# Patient Record
Sex: FEMALE | Race: BLACK OR AFRICAN AMERICAN | NOT HISPANIC OR LATINO | Employment: OTHER | URBAN - METROPOLITAN AREA
[De-identification: names, ages, dates, MRNs, and addresses within clinical notes are randomized per-mention and may not be internally consistent; named-entity substitution may affect disease eponyms.]

---

## 2018-03-15 ENCOUNTER — OFFICE VISIT (OUTPATIENT)
Dept: OBSTETRICS AND GYNECOLOGY | Facility: CLINIC | Age: 68
End: 2018-03-15
Payer: MEDICARE

## 2018-03-15 VITALS
DIASTOLIC BLOOD PRESSURE: 79 MMHG | WEIGHT: 215.5 LBS | HEIGHT: 64 IN | SYSTOLIC BLOOD PRESSURE: 145 MMHG | BODY MASS INDEX: 36.79 KG/M2

## 2018-03-15 DIAGNOSIS — N76.3 CHRONIC VULVITIS: Primary | ICD-10-CM

## 2018-03-15 DIAGNOSIS — N94.10 DYSPAREUNIA, FEMALE: ICD-10-CM

## 2018-03-15 PROCEDURE — 99203 OFFICE O/P NEW LOW 30 MIN: CPT | Mod: S$GLB,,, | Performed by: OBSTETRICS & GYNECOLOGY

## 2018-03-15 PROCEDURE — 99999 PR PBB SHADOW E&M-NEW PATIENT-LVL III: CPT | Mod: PBBFAC,,, | Performed by: OBSTETRICS & GYNECOLOGY

## 2018-03-15 RX ORDER — ETODOLAC 500 MG/1
TABLET, FILM COATED ORAL
COMMUNITY
Start: 2017-12-05

## 2018-03-15 RX ORDER — PENICILLIN V POTASSIUM 500 MG/1
TABLET, FILM COATED ORAL
COMMUNITY
Start: 2018-03-10 | End: 2021-12-20 | Stop reason: ALTCHOICE

## 2018-03-15 RX ORDER — METHOCARBAMOL 750 MG/1
TABLET, FILM COATED ORAL
COMMUNITY
Start: 2017-07-19 | End: 2021-12-20

## 2018-03-15 RX ORDER — MULTIVITAMIN
TABLET ORAL
COMMUNITY

## 2018-03-15 RX ORDER — ESTRADIOL 10 UG/1
INSERT VAGINAL
Qty: 18 TABLET | Refills: 11 | Status: SHIPPED | OUTPATIENT
Start: 2018-03-15 | End: 2021-12-20 | Stop reason: ALTCHOICE

## 2018-03-15 RX ORDER — ARIPIPRAZOLE 5 MG/1
TABLET ORAL
COMMUNITY
Start: 2018-03-10

## 2018-03-15 RX ORDER — ALPRAZOLAM 0.5 MG/1
TABLET ORAL
COMMUNITY
Start: 2018-03-10

## 2018-03-15 RX ORDER — MUPIROCIN CALCIUM 20 MG/G
CREAM TOPICAL
COMMUNITY
Start: 2017-08-30 | End: 2018-08-30

## 2018-03-15 RX ORDER — LIDOCAINE AND PRILOCAINE 25; 25 MG/G; MG/G
CREAM TOPICAL
COMMUNITY
Start: 2018-01-19 | End: 2021-12-20 | Stop reason: ALTCHOICE

## 2018-03-15 RX ORDER — NYSTATIN 100000 U/G
CREAM TOPICAL
COMMUNITY
Start: 2017-08-30 | End: 2021-12-20 | Stop reason: ALTCHOICE

## 2018-03-15 RX ORDER — MUPIROCIN 20 MG/G
OINTMENT TOPICAL
Refills: 1 | COMMUNITY
Start: 2017-12-12

## 2018-03-15 RX ORDER — DULOXETIN HYDROCHLORIDE 30 MG/1
CAPSULE, DELAYED RELEASE ORAL
COMMUNITY
Start: 2017-04-02

## 2018-03-15 RX ORDER — ACETAMINOPHEN AND CODEINE PHOSPHATE 300; 30 MG/1; MG/1
TABLET ORAL
COMMUNITY
Start: 2017-08-23 | End: 2021-12-20

## 2018-03-15 RX ORDER — AMLODIPINE BESYLATE 5 MG/1
TABLET ORAL
COMMUNITY
Start: 2018-03-09

## 2018-03-15 RX ORDER — TEMAZEPAM 15 MG/1
CAPSULE ORAL
COMMUNITY
Start: 2018-03-10

## 2018-03-15 RX ORDER — OLMESARTAN MEDOXOMIL AND HYDROCHLOROTHIAZIDE 40/12.5 40; 12.5 MG/1; MG/1
TABLET ORAL
COMMUNITY
Start: 2017-08-30

## 2018-03-15 RX ORDER — CLOTRIMAZOLE AND BETAMETHASONE DIPROPIONATE 10; .64 MG/G; MG/G
CREAM TOPICAL
Qty: 15 G | Refills: 1 | Status: SHIPPED | OUTPATIENT
Start: 2018-03-15 | End: 2019-03-15

## 2018-03-15 NOTE — PROGRESS NOTES
Subjective:       Patient ID: Celeste Martini is a 67 y.o. female.    Chief Complaint:  Vaginitis (itch x's 1 month)      History of Present Illness  HPI  here for problem   C/o itching on skin under breast and below panus and in groin  Using topical diaper rash like cream  Also c/o vaginal pain with intercourse    GYN & OB History  Patient's last menstrual period was 03/15/1988.   Date of Last Pap: No result found    OB History    Para Term  AB Living   3 2     1 3   SAB TAB Ectopic Multiple Live Births   1       2      # Outcome Date GA Lbr Paul/2nd Weight Sex Delivery Anes PTL Lv   3 SAB            2 Para      Vag-Spont   WILLIAM   1 Para      Vag-Spont   WILLIAM          Review of Systems  Review of Systems   Genitourinary: Positive for dyspareunia.   Skin:  Positive for rash.           Objective:    Physical Exam:   Constitutional: She appears well-developed.     Eyes: Conjunctivae and EOM are normal. Pupils are equal, round, and reactive to light.    Neck: Normal range of motion. Neck supple.     Pulmonary/Chest: Effort normal. Right breast exhibits no mass, no nipple discharge, no skin change, no tenderness and presence. Left breast exhibits no mass, no nipple discharge, no skin change, no tenderness and presence. Breasts are symmetrical.        Abdominal: Soft.     Genitourinary: Vagina normal.       Pelvic exam was performed with patient supine. Uterus is absent. Right adnexum displays no mass. Left adnexum displays no mass. Vaginal cuff normal.Cervix exhibits absence.   Genitourinary Comments: atrophic           Musculoskeletal: Normal range of motion.       Neurological: She is alert.    Skin: Skin is warm.    Psychiatric: She has a normal mood and affect.          Assessment:        Encounter Diagnoses   Name Primary?    Chronic vulvitis Yes    Dyspareunia, female              Plan:      Gentle skin cleansing  rx sent for lotrisone  rx sent for yuvafem

## 2018-03-16 ENCOUNTER — TELEPHONE (OUTPATIENT)
Dept: OBSTETRICS AND GYNECOLOGY | Facility: CLINIC | Age: 68
End: 2018-03-16

## 2018-03-16 NOTE — TELEPHONE ENCOUNTER
----- Message from iTana Denney sent at 3/16/2018 11:47 AM CDT -----  Contact: Pt  Pt needs to speak with nurse about the medication     Says the medication needs instructions     estradiol (YUVAFEM) 10 mcg Tab    Says the pharmacy does not understand the instructions.     Pt can be reached at [phone]    Thanks

## 2018-03-23 ENCOUNTER — TELEPHONE (OUTPATIENT)
Dept: OBSTETRICS AND GYNECOLOGY | Facility: CLINIC | Age: 68
End: 2018-03-23

## 2018-04-16 ENCOUNTER — TELEPHONE (OUTPATIENT)
Dept: OBSTETRICS AND GYNECOLOGY | Facility: CLINIC | Age: 68
End: 2018-04-16

## 2018-04-16 RX ORDER — CLOTRIMAZOLE AND BETAMETHASONE DIPROPIONATE 10; .64 MG/G; MG/G
CREAM TOPICAL
Qty: 45 G | Refills: 1 | Status: SHIPPED | OUTPATIENT
Start: 2018-04-16 | End: 2019-04-16

## 2018-04-16 NOTE — TELEPHONE ENCOUNTER
Spoke to patient and she states that she will pay for the cream since it is not covered under her insurance.

## 2018-04-16 NOTE — TELEPHONE ENCOUNTER
Spoke to patient and she states she is having irritation on her panty line and under her breasts and that the cream is not working. Patient wants to know if a different cream can be recommended. Pharmacy and allergies reviewed. Please advise.

## 2018-04-16 NOTE — TELEPHONE ENCOUNTER
----- Message from Izabella Washington sent at 4/16/2018  9:55 AM CDT -----  Contact: Self [phone]  Patient is requesting a call back from the nurse to see if she can get a different RX as the cream is not working for her.    Patient may be reached at [phone].    Thank you.    LC

## 2018-04-16 NOTE — TELEPHONE ENCOUNTER
The rx that I would send for combination antifungal/anti itch is not covered by her insurance (clotrimazole/betamethasone)    She can  otc hydrocortisone and mix together with nystatin and rub on skin

## 2018-04-25 ENCOUNTER — TELEPHONE (OUTPATIENT)
Dept: OBSTETRICS AND GYNECOLOGY | Facility: CLINIC | Age: 68
End: 2018-04-25

## 2018-04-25 NOTE — TELEPHONE ENCOUNTER
----- Message from Mario Cardozo sent at 4/25/2018 12:09 PM CDT -----  Contact: Pt  Pt. Is calling regarding questions about the cream clotrimazole that was sent over. Pt states that it was the same cream from before just in a larger quantity. Pt. States that the cream is not working.  ..[phone] (home)

## 2018-04-26 RX ORDER — CLOBETASOL PROPIONATE 0.5 MG/G
CREAM TOPICAL 2 TIMES DAILY
Qty: 45 G | Refills: 2 | Status: SHIPPED | OUTPATIENT
Start: 2018-04-26 | End: 2018-05-06

## 2018-04-26 NOTE — TELEPHONE ENCOUNTER
Pt. Is calling regarding questions about the cream clotrimazole that was sent over. Pt states that it was the same cream from before just in a larger quantity. Pt. States that the cream is not working.  ..[phone] (home)

## 2021-12-20 ENCOUNTER — OFFICE VISIT (OUTPATIENT)
Dept: OBSTETRICS AND GYNECOLOGY | Facility: CLINIC | Age: 71
End: 2021-12-20
Payer: MEDICARE

## 2021-12-20 VITALS
SYSTOLIC BLOOD PRESSURE: 140 MMHG | HEIGHT: 64 IN | WEIGHT: 214.06 LBS | BODY MASS INDEX: 36.55 KG/M2 | DIASTOLIC BLOOD PRESSURE: 62 MMHG

## 2021-12-20 DIAGNOSIS — N76.3 CHRONIC VULVITIS: Primary | ICD-10-CM

## 2021-12-20 PROCEDURE — 99203 OFFICE O/P NEW LOW 30 MIN: CPT | Mod: S$GLB,,, | Performed by: OBSTETRICS & GYNECOLOGY

## 2021-12-20 PROCEDURE — 99999 PR PBB SHADOW E&M-NEW PATIENT-LVL III: ICD-10-PCS | Mod: PBBFAC,,, | Performed by: OBSTETRICS & GYNECOLOGY

## 2021-12-20 PROCEDURE — 99999 PR PBB SHADOW E&M-NEW PATIENT-LVL III: CPT | Mod: PBBFAC,,, | Performed by: OBSTETRICS & GYNECOLOGY

## 2021-12-20 PROCEDURE — 99203 PR OFFICE/OUTPT VISIT, NEW, LEVL III, 30-44 MIN: ICD-10-PCS | Mod: S$GLB,,, | Performed by: OBSTETRICS & GYNECOLOGY

## 2021-12-20 RX ORDER — GLUCOSAMINE HCL 500 MG
TABLET ORAL
COMMUNITY

## 2021-12-20 RX ORDER — FLUCONAZOLE 200 MG/1
200 TABLET ORAL DAILY
Qty: 3 TABLET | Refills: 0 | Status: SHIPPED | OUTPATIENT
Start: 2021-12-20 | End: 2021-12-23

## 2021-12-20 RX ORDER — NYSTATIN AND TRIAMCINOLONE ACETONIDE 100000; 1 [USP'U]/G; MG/G
CREAM TOPICAL
Qty: 30 G | Refills: 1 | Status: SHIPPED | OUTPATIENT
Start: 2021-12-20 | End: 2022-12-20

## 2021-12-20 RX ORDER — ACETAMINOPHEN, DIPHENHYDRAMINE HCL, PHENYLEPHRINE HCL 325; 25; 5 MG/1; MG/1; MG/1
TABLET ORAL
COMMUNITY

## 2021-12-20 RX ORDER — INDOMETHACIN 50 MG/1
50 CAPSULE ORAL 2 TIMES DAILY PRN
COMMUNITY
Start: 2021-10-19

## 2022-03-14 ENCOUNTER — NEW PATIENT (OUTPATIENT)
Dept: URBAN - METROPOLITAN AREA CLINIC 20 | Facility: CLINIC | Age: 72
End: 2022-03-14

## 2022-03-14 DIAGNOSIS — H16.223: ICD-10-CM

## 2022-03-14 DIAGNOSIS — H35.363: ICD-10-CM

## 2022-03-14 DIAGNOSIS — Z96.1: ICD-10-CM

## 2022-03-14 DIAGNOSIS — H18.611: ICD-10-CM

## 2022-03-14 PROCEDURE — 92004 COMPRE OPH EXAM NEW PT 1/>: CPT

## 2022-03-14 ASSESSMENT — VISUAL ACUITY
OU_SC: J7
OD_SC: 20/200
OS_SC: 20/30
OD_PH: 20/50
OS_SC: J10
OD_SC: 20/400

## 2022-03-14 ASSESSMENT — TONOMETRY
OS_IOP_MMHG: 12
OD_IOP_MMHG: 14

## 2022-08-02 ENCOUNTER — TELEPHONE (OUTPATIENT)
Dept: OBSTETRICS AND GYNECOLOGY | Facility: HOSPITAL | Age: 72
End: 2022-08-02
Payer: MEDICARE

## 2022-08-02 NOTE — TELEPHONE ENCOUNTER
Attempted to reach patient.  Voicemail box not set up yet.    Called emergency contact and l/m for patient to return call to [phone] so we can assist patient with appointment.

## 2022-08-02 NOTE — TELEPHONE ENCOUNTER
----- Message from Britt Levi LPN sent at 8/1/2022 10:41 AM CDT -----  Contact: [phone]  NADYA Levi,     Patient  called stated that the medication nystatin-triamcinolone (MYCOLOG II) cream that she was given is not working.   Please advise.   Thank you.     Diane   ----- Message -----  From: Radha Ramirez  Sent: 8/1/2022  10:37 AM CDT  To: Gurmeet Corey Staff    .1 Patient would like to get medical advice.  Symptoms (please be specific): vaginal pimples, boils, sweating.   How long has patient had these symptoms:   Pharmacy name and phone#:[facility] #[id number] - [place] - [address] AT [facility] [address]  Phone:  [phone] Fax:  [phone]  Any drug allergies: on file  Comments: Patient would like to get medical advice. Patient stated that the medication nystatin-triamcinolone (MYCOLOG II) cream that she was given is not working. Please call and advise. Thank you.

## 2022-08-09 ENCOUNTER — OFFICE VISIT (OUTPATIENT)
Dept: OBSTETRICS AND GYNECOLOGY | Facility: CLINIC | Age: 72
End: 2022-08-09
Payer: MEDICARE

## 2022-08-09 VITALS
DIASTOLIC BLOOD PRESSURE: 56 MMHG | SYSTOLIC BLOOD PRESSURE: 118 MMHG | WEIGHT: 206.69 LBS | BODY MASS INDEX: 35.29 KG/M2 | HEIGHT: 64 IN

## 2022-08-09 DIAGNOSIS — L29.2 VULVAR ITCHING: Primary | ICD-10-CM

## 2022-08-09 PROBLEM — E04.2 MULTINODULAR GOITER: Status: ACTIVE | Noted: 2018-01-03

## 2022-08-09 PROBLEM — E88.819 INSULIN RESISTANCE: Status: ACTIVE | Noted: 2022-08-09

## 2022-08-09 PROBLEM — I10 ESSENTIAL HYPERTENSION: Status: ACTIVE | Noted: 2022-08-09

## 2022-08-09 PROBLEM — E55.9 VITAMIN D DEFICIENCY: Status: ACTIVE | Noted: 2022-08-09

## 2022-08-09 PROBLEM — M85.80 OSTEOPENIA: Status: ACTIVE | Noted: 2022-08-09

## 2022-08-09 PROBLEM — E66.01 SEVERE OBESITY (BMI 35.0-35.9 WITH COMORBIDITY): Status: ACTIVE | Noted: 2019-04-30

## 2022-08-09 PROBLEM — M72.2 PLANTAR FASCIITIS: Status: ACTIVE | Noted: 2022-08-09

## 2022-08-09 PROBLEM — K57.90 DIVERTICULOSIS: Status: ACTIVE | Noted: 2019-12-07

## 2022-08-09 PROCEDURE — 1126F PR PAIN SEVERITY QUANTIFIED, NO PAIN PRESENT: ICD-10-PCS | Mod: CPTII,S$GLB,, | Performed by: NURSE PRACTITIONER

## 2022-08-09 PROCEDURE — 1159F PR MEDICATION LIST DOCUMENTED IN MEDICAL RECORD: ICD-10-PCS | Mod: CPTII,S$GLB,, | Performed by: NURSE PRACTITIONER

## 2022-08-09 PROCEDURE — 1101F PT FALLS ASSESS-DOCD LE1/YR: CPT | Mod: CPTII,S$GLB,, | Performed by: NURSE PRACTITIONER

## 2022-08-09 PROCEDURE — 3074F PR MOST RECENT SYSTOLIC BLOOD PRESSURE < 130 MM HG: ICD-10-PCS | Mod: CPTII,S$GLB,, | Performed by: NURSE PRACTITIONER

## 2022-08-09 PROCEDURE — 3008F PR BODY MASS INDEX (BMI) DOCUMENTED: ICD-10-PCS | Mod: CPTII,S$GLB,, | Performed by: NURSE PRACTITIONER

## 2022-08-09 PROCEDURE — 1126F AMNT PAIN NOTED NONE PRSNT: CPT | Mod: CPTII,S$GLB,, | Performed by: NURSE PRACTITIONER

## 2022-08-09 PROCEDURE — 3078F DIAST BP <80 MM HG: CPT | Mod: CPTII,S$GLB,, | Performed by: NURSE PRACTITIONER

## 2022-08-09 PROCEDURE — 99213 PR OFFICE/OUTPT VISIT, EST, LEVL III, 20-29 MIN: ICD-10-PCS | Mod: S$GLB,,, | Performed by: NURSE PRACTITIONER

## 2022-08-09 PROCEDURE — 1159F MED LIST DOCD IN RCRD: CPT | Mod: CPTII,S$GLB,, | Performed by: NURSE PRACTITIONER

## 2022-08-09 PROCEDURE — 3074F SYST BP LT 130 MM HG: CPT | Mod: CPTII,S$GLB,, | Performed by: NURSE PRACTITIONER

## 2022-08-09 PROCEDURE — 3078F PR MOST RECENT DIASTOLIC BLOOD PRESSURE < 80 MM HG: ICD-10-PCS | Mod: CPTII,S$GLB,, | Performed by: NURSE PRACTITIONER

## 2022-08-09 PROCEDURE — 87481 CANDIDA DNA AMP PROBE: CPT | Mod: 59 | Performed by: NURSE PRACTITIONER

## 2022-08-09 PROCEDURE — 99999 PR PBB SHADOW E&M-EST. PATIENT-LVL III: CPT | Mod: PBBFAC,,, | Performed by: NURSE PRACTITIONER

## 2022-08-09 PROCEDURE — 87801 DETECT AGNT MULT DNA AMPLI: CPT | Performed by: NURSE PRACTITIONER

## 2022-08-09 PROCEDURE — 99213 OFFICE O/P EST LOW 20 MIN: CPT | Mod: S$GLB,,, | Performed by: NURSE PRACTITIONER

## 2022-08-09 PROCEDURE — 3288F PR FALLS RISK ASSESSMENT DOCUMENTED: ICD-10-PCS | Mod: CPTII,S$GLB,, | Performed by: NURSE PRACTITIONER

## 2022-08-09 PROCEDURE — 1160F PR REVIEW ALL MEDS BY PRESCRIBER/CLIN PHARMACIST DOCUMENTED: ICD-10-PCS | Mod: CPTII,S$GLB,, | Performed by: NURSE PRACTITIONER

## 2022-08-09 PROCEDURE — 1160F RVW MEDS BY RX/DR IN RCRD: CPT | Mod: CPTII,S$GLB,, | Performed by: NURSE PRACTITIONER

## 2022-08-09 PROCEDURE — 3288F FALL RISK ASSESSMENT DOCD: CPT | Mod: CPTII,S$GLB,, | Performed by: NURSE PRACTITIONER

## 2022-08-09 PROCEDURE — 1101F PR PT FALLS ASSESS DOC 0-1 FALLS W/OUT INJ PAST YR: ICD-10-PCS | Mod: CPTII,S$GLB,, | Performed by: NURSE PRACTITIONER

## 2022-08-09 PROCEDURE — 99999 PR PBB SHADOW E&M-EST. PATIENT-LVL III: ICD-10-PCS | Mod: PBBFAC,,, | Performed by: NURSE PRACTITIONER

## 2022-08-09 PROCEDURE — 3008F BODY MASS INDEX DOCD: CPT | Mod: CPTII,S$GLB,, | Performed by: NURSE PRACTITIONER

## 2022-08-09 RX ORDER — ALBUTEROL SULFATE 90 UG/1
AEROSOL, METERED RESPIRATORY (INHALATION)
COMMUNITY
Start: 2022-06-21

## 2022-08-09 RX ORDER — CLOTRIMAZOLE 1 %
CREAM (GRAM) TOPICAL
COMMUNITY
Start: 2022-08-08 | End: 2023-02-04

## 2022-08-09 NOTE — PROGRESS NOTES
Subjective:       Patient ID: Celeste Martini is a 72 y.o. female.    Chief Complaint:  No chief complaint on file.      History of Present Illness  HPI  Present for vulvar itching chronic  Has been experiencing vulvar boils x2  Did burst it; pus came out x1 week  They do come back; just wanted to make sure it was fine today  Has been mixing mycolog and clobetasol with relief nightly  Not sexually active  Mostly hot showers; has been changing her soap; dove sensitive body wash and bar and honey pot  Cotton, white underwear    GYN & OB History  Patient's last menstrual period was 03/15/1988.   Date of Last Pap: No result found    OB History    Para Term  AB Living   3 2     1 2   SAB IAB Ectopic Multiple Live Births   1       2      # Outcome Date GA Lbr Paul/2nd Weight Sex Delivery Anes PTL Lv   3 SAB            2 Para      Vag-Spont   WILLIAM   1 Para      Vag-Spont   WILLAIM       Review of Systems  Review of Systems   Genitourinary: Negative for vaginal discharge and vaginal dryness.        +vulvar itching   All other systems reviewed and are negative.          Objective:      Physical Exam:   Constitutional: She is oriented to person, place, and time. She appears well-developed and well-nourished. No distress.    HENT:   Head: Normocephalic and atraumatic.    Eyes: Pupils are equal, round, and reactive to light. Conjunctivae and EOM are normal.      Pulmonary/Chest: Effort normal.        Abdominal: Hernia confirmed negative in the right inguinal area and confirmed negative in the left inguinal area.     Genitourinary:    Inguinal canal normal.            Pelvic exam was performed with patient supine.   The external female genitalia was normal.   No external genitalia lesions identified,Genitalia hair distrobution normal .   Labial bartholins normal.There is no rash, tenderness, lesion or injury on the right labia. There is no rash, tenderness, lesion or injury on the left labia. Vaginal cuff normal.  There is  vaginal discharge (small amt, white, thin; no odor) in the vagina. No erythema or bleeding in the vagina.    No foreign body in the vagina.      No signs of injury in the vagina.   Vaginal atrophy noted. Cervix is absent.Uterus is absent. Normal urethral meatus.Urethral Meatus exhibits: urethral lesion and prolapsedUrethra findings: no urethral mass, no tenderness and no urethral scarring          Musculoskeletal: Normal range of motion and moves all extremeties.      Lymphadenopathy: No inguinal adenopathy noted on the right or left side.    Neurological: She is alert and oriented to person, place, and time.    Skin: Skin is warm and dry. No rash noted. She is not diaphoretic. No erythema. No pallor.    Psychiatric: She has a normal mood and affect. Her behavior is normal. Judgment and thought content normal.             Assessment:        1. Vulvar itching               Plan:   Avoid hot showers -- lukewarm to cool  Gentle cleansing soaps  Avoid using both mycolog and clobetasol together  Try alternating days  Affirm collected.    Continue annual well woman exam.    I spent a total of 20 minutes on the day of the visit.This includes face to face time and non-face to face time preparing to see the patient (eg, review of tests), Obtaining and/or reviewing separately obtained history, Documenting clinical information in the electronic or other health record, Independently interpreting results and communicating results to the patient/family/caregiver, or Care coordination.      Vulvar itching  -     Vaginosis Screen by DNA Probe

## 2022-08-12 ENCOUNTER — TELEPHONE (OUTPATIENT)
Dept: OBSTETRICS AND GYNECOLOGY | Facility: CLINIC | Age: 72
End: 2022-08-12
Payer: MEDICARE

## 2022-08-12 DIAGNOSIS — B96.89 BV (BACTERIAL VAGINOSIS): Primary | ICD-10-CM

## 2022-08-12 DIAGNOSIS — N76.0 BV (BACTERIAL VAGINOSIS): Primary | ICD-10-CM

## 2022-08-12 LAB
BACTERIAL VAGINOSIS DNA: POSITIVE
CANDIDA GLABRATA DNA: NEGATIVE
CANDIDA KRUSEI DNA: NEGATIVE
CANDIDA RRNA VAG QL PROBE: NEGATIVE
T VAGINALIS RRNA GENITAL QL PROBE: NEGATIVE

## 2022-08-12 RX ORDER — METRONIDAZOLE 500 MG/1
500 TABLET ORAL 2 TIMES DAILY
Qty: 14 TABLET | Refills: 0 | Status: SHIPPED | OUTPATIENT
Start: 2022-08-12 | End: 2022-08-19

## 2022-08-12 NOTE — TELEPHONE ENCOUNTER
----- Message from Allegra Snyder sent at 8/12/2022  1:10 PM CDT -----  Contact: Celeste  Patient would like call at [phone] in regard to getting her results. She states that since her last visit she has two more boils that appear and they are itching. Also she feels like the cream that she is using is not working because it is still itching.         Thanks

## 2022-08-12 NOTE — PROGRESS NOTES
"Spoke with patient. After verifying 2 patient identifiers, results given. Patient verbalized understanding and questioned whether results had anything to do with "itching" and "boils" on vagina. Asked pt if she would like to schedule an appointment to get further evaluated since she is still having this issue. Patient stated that she was not able to come in because she is not home and she is about to have eye surgery. Advised pt to alternate b/t cold and warm compresses and soak in the tub to help relieve her symptoms. Advised pt to complete medication therapy as it may help relieve symptoms. Patient verbalized understanding."

## 2022-08-12 NOTE — TELEPHONE ENCOUNTER
"Returned phone call to patient. Patient requested results of vaginal swab taken during office visit. Informed that results are still pending. She mentioned that the itching has not improved, and since coming into the office, she has not used anything on the area. States that she has what appears to be 3 "boils". Wanted to know if there was anything else she can do. Please advise..  "

## 2022-08-12 NOTE — TELEPHONE ENCOUNTER
"Spoke with patient. After verifying 2 patient identifiers, results given. Patient verbalized understanding and questioned whether results had anything to do with "itching" and "boils" on vagina. Asked pt if she would like to schedule an appointment to get further evaluated since she is still having this issue. Patient stated that she was not able to come in because she is not home and she is about to have eye surgery. Advised pt to alternate b/t cold and warm compresses and soak in the tub to help relieve her symptoms. Advised pt to complete medication therapy as it may help relieve symptoms. Patient verbalized understanding.  "